# Patient Record
(demographics unavailable — no encounter records)

---

## 2025-02-05 NOTE — PLAN
[FreeTextEntry1] : 1. Dilation and Curettage - All available medical records and ultrasounds have been reviewed - TVUS done today - All consents reviewed today - The patient does not desire medication  - reviewed patient's responsibility to contact insurance company for coverage confirmation  2. Surgery scheduling - Patient to be precertified for D+C - D+C scheduled for 25 at [facility] - No medical issues - preoperative clearance performed by surgeon  3. ID/Neuro/cervical prep - doxycycline 200 mg in OR - Reviewed Ibuprofen 600 mg po q 6 prn  4. Labs/Blood type - CBC, T+S drawn today - Rhogam pending results  5. Contraception - will address at postop visit  6. Post-op - Post-operative follow-up visit to be scheduled in 2 weeks - Pre-and Post-operative instructions given, reviewed bleeding and infection precautions - Provided 24-hour emergency contact phone number

## 2025-02-05 NOTE — REASON FOR VISIT
[Consultation] : consultation for [FreeTextEntry2] : pregnancy options counseling [FreeTextEntry1] : Dr. Crystal Son

## 2025-02-05 NOTE — HISTORY OF PRESENT ILLNESS
[Patient reported PAP Smear was normal] : Patient reported PAP Smear was normal [N] : Patient does not use contraception [Y] : Positive pregnancy history [Frequency: Q ___ days] : menstrual periods occur approximately every [unfilled] days [Menstrual Cramps] : menstrual cramps [Currently Active] : currently active [Men] : men [No] : No [PapSmeardate] : 09/2024 [TextBox_31] : As per patient [LMPDate] : 12/12/2024 [MensesFreq] : 32-35 [MensesLength] : 5-7 [PGxTotal] : 1 [TextBox_9] : 12 [FreeTextEntry1] : 12/12/2024

## 2025-02-26 NOTE — PHYSICAL EXAM
[Chaperone Present] : A chaperone was present in the examining room during all aspects of the physical examination [Appropriately responsive] : appropriately responsive [Alert] : alert [No Acute Distress] : no acute distress [Oriented x3] : oriented x3 [FreeTextEntry2] : Sebastián Padilla

## 2025-02-26 NOTE — PLAN
[FreeTextEntry1] : 1.Dilation and Curettage - Patient is recovering well. No signs/symptoms of infection. - TVUS WNL - Reviewed pathology from procedure - Reviewed that first period may be heavier than normal. -Patient is cleared to return to all physical activities  2.Contraception - Reviewed contraceptive options. Patient declines  3. Psych - Discussed normal grieving process, reviewed support people  4. Follow-up - Patient referred back to her primary Ob-gyn, Dr. Son for routine care - all questions/concerns addressed of pt to their satisfaction

## 2025-02-26 NOTE — PLAN
[FreeTextEntry1] : 1.Dilation and Curettage - Patient is recovering well. No signs/symptoms of infection. - TVUS WNL - Reviewed pathology from procedure - Reviewed that first period may be heavier than normal. -Patient is cleared to return to all physical activities  2.Contraception - Reviewed contraceptive options. Patient declines  3. Psych - Discussed normal grieving process, reviewed support people  4. Follow-up - Patient referred back to her primary Ob-gyn, Dr. oSn for routine care - all questions/concerns addressed of pt to their satisfaction

## 2025-02-26 NOTE — HISTORY OF PRESENT ILLNESS
[FreeTextEntry1] : Nichole presents for postop visit. She has been feeling well since procedure. Having some difficulty with sleep. She denies severe pain, heavy bleeding, or fevers. Is thinking about possibly TTC over summer with her current partner.

## 2025-02-26 NOTE — PROCEDURE
[Transvaginal Ultrasound] : transvaginal ultrasound [Anteverted] : anteverted [L: ___ cm] : L: [unfilled] cm [W: ___cm] : W: [unfilled] cm [H: ___ cm] : H: [unfilled] cm [FreeTextEntry9] : postop visit after D&C [FreeTextEntry5] : EMS 2.49 mm [FreeTextEntry4] : No evidence of rPOC